# Patient Record
Sex: MALE | Race: BLACK OR AFRICAN AMERICAN | NOT HISPANIC OR LATINO | Employment: FULL TIME | URBAN - METROPOLITAN AREA
[De-identification: names, ages, dates, MRNs, and addresses within clinical notes are randomized per-mention and may not be internally consistent; named-entity substitution may affect disease eponyms.]

---

## 2022-08-03 ENCOUNTER — HOSPITAL ENCOUNTER (EMERGENCY)
Facility: HOSPITAL | Age: 32
Discharge: HOME | End: 2022-08-03
Attending: SPECIALIST
Payer: MEDICAID

## 2022-08-03 VITALS
SYSTOLIC BLOOD PRESSURE: 136 MMHG | RESPIRATION RATE: 16 BRPM | OXYGEN SATURATION: 100 % | HEART RATE: 86 BPM | TEMPERATURE: 98 F | DIASTOLIC BLOOD PRESSURE: 82 MMHG

## 2022-08-03 DIAGNOSIS — Z76.0 MEDICATION REFILL: Primary | ICD-10-CM

## 2022-08-03 PROCEDURE — 99283 EMERGENCY DEPT VISIT LOW MDM: CPT

## 2022-08-03 RX ORDER — ACYCLOVIR 800 MG/1
800 TABLET ORAL
Qty: 50 TABLET | Refills: 0 | Status: SHIPPED | OUTPATIENT
Start: 2022-08-03 | End: 2022-12-19 | Stop reason: SDUPTHER

## 2022-08-03 NOTE — ED PROVIDER NOTES
Encounter Date: 8/3/2022       History     Chief Complaint   Patient presents with    Medication Refill     Medication refill     Patient reports a history of genital herpes and is out of his medication acyclovir        Review of patient's allergies indicates:  No Known Allergies  No past medical history on file.  No past surgical history on file.  No family history on file.     Review of Systems   Constitutional: Negative.    Respiratory: Negative.    Cardiovascular: Negative.    Gastrointestinal: Negative.    Genitourinary: Positive for genital sores (Usually once a year).   Musculoskeletal: Negative.    Neurological: Negative.        Physical Exam     Initial Vitals [08/03/22 0431]   BP Pulse Resp Temp SpO2   136/82 86 16 98.2 °F (36.8 °C) 100 %      MAP       --         Physical Exam    Nursing note and vitals reviewed.  Constitutional: He appears well-developed and well-nourished.   HENT:   Head: Normocephalic and atraumatic.   Eyes: EOM are normal. Pupils are equal, round, and reactive to light.   Neck: Neck supple.   Normal range of motion.  Cardiovascular: Normal rate, regular rhythm and normal heart sounds.   Pulmonary/Chest: Breath sounds normal.   Abdominal: Abdomen is soft.   Musculoskeletal:         General: Normal range of motion.      Cervical back: Normal range of motion and neck supple.     Neurological: He is alert and oriented to person, place, and time.   Skin: Skin is warm and dry.         ED Course   Procedures  Labs Reviewed - No data to display       Imaging Results    None          Medications - No data to display                       Clinical Impression:   Final diagnoses:  [Z76.0] Medication refill (Primary)          ED Disposition Condition    Discharge Stable        ED Prescriptions     Medication Sig Dispense Start Date End Date Auth. Provider    acyclovir (ZOVIRAX) 800 MG Tab Take 1 tablet (800 mg total) by mouth 5 (five) times daily. for 10 days 50 tablet 8/3/2022 8/13/2022 Sean  HUMAIRA Galvez MD        Follow-up Information     Follow up With Specialties Details Why Contact Info    [facility] - Emergency Dept Emergency Medicine  As needed [address] [facility] [id number] [phone]           Sean Galvez MD  08/03/22 0459

## 2022-12-19 ENCOUNTER — HOSPITAL ENCOUNTER (EMERGENCY)
Facility: HOSPITAL | Age: 32
Discharge: HOME | End: 2022-12-19
Attending: EMERGENCY MEDICINE
Payer: MEDICAID

## 2022-12-19 VITALS
BODY MASS INDEX: 26.22 KG/M2 | DIASTOLIC BLOOD PRESSURE: 74 MMHG | RESPIRATION RATE: 20 BRPM | SYSTOLIC BLOOD PRESSURE: 132 MMHG | HEART RATE: 84 BPM | TEMPERATURE: 98 F | WEIGHT: 177 LBS | OXYGEN SATURATION: 100 % | HEIGHT: 69 IN

## 2022-12-19 DIAGNOSIS — A60.01 HERPES SIMPLEX INFECTION OF PENIS: Primary | ICD-10-CM

## 2022-12-19 PROCEDURE — 99283 EMERGENCY DEPT VISIT LOW MDM: CPT

## 2022-12-19 RX ORDER — ACYCLOVIR 800 MG/1
800 TABLET ORAL
Qty: 50 TABLET | Refills: 0 | Status: SHIPPED | OUTPATIENT
Start: 2022-12-19 | End: 2023-03-29 | Stop reason: SDUPTHER

## 2022-12-19 NOTE — ED PROVIDER NOTES
Encounter Date: 12/19/2022       History     Chief Complaint   Patient presents with    Medication Refill     Patient reports broke out in genital herpes needs prescriptions     This 32-year-old man with history of genital herpes reports he began with a new outbreak since last night.  Presents requesting prescription.     Review of patient's allergies indicates:  No Known Allergies  History reviewed. No pertinent past medical history.  History reviewed. No pertinent surgical history.  History reviewed. No pertinent family history.     Review of Systems   Constitutional:  Negative for fever.   HENT:  Negative for sore throat.    Respiratory:  Negative for shortness of breath.    Cardiovascular:  Negative for chest pain.   Gastrointestinal:  Negative for nausea.   Genitourinary:  Negative for dysuria.   Musculoskeletal:  Negative for back pain.   Skin:  Negative for rash.   Neurological:  Negative for weakness.   Hematological:  Does not bruise/bleed easily.     Physical Exam     Initial Vitals [12/19/22 0706]   BP Pulse Resp Temp SpO2   132/74 84 20 98.2 °F (36.8 °C) 100 %      MAP       --         Physical Exam    Constitutional: He appears well-developed and well-nourished.   HENT:   Head: Normocephalic and atraumatic.   Mouth/Throat: Mucous membranes are normal.   Eyes: EOM are normal. Pupils are equal, round, and reactive to light.   Neck: Neck supple.   Normal range of motion.  Cardiovascular:  Normal rate, regular rhythm, normal heart sounds and intact distal pulses.           Pulmonary/Chest: Breath sounds normal.   Abdominal: Abdomen is soft. Bowel sounds are normal.   Genitourinary:    Genitourinary Comments: 4-5 small vesicles on the penile shaft     Musculoskeletal:         General: Normal range of motion.      Cervical back: Normal range of motion and neck supple.     Neurological: He is alert and oriented to person, place, and time. He has normal strength.   Skin: Skin is warm and dry. Capillary refill  takes less than 2 seconds.   Psychiatric: He has a normal mood and affect. His behavior is normal. Judgment and thought content normal.       ED Course   Procedures  Labs Reviewed - No data to display       Imaging Results    None          Medications - No data to display                           Clinical Impression:   Final diagnoses:  [A60.01] Herpes simplex infection of penis (Primary)        ED Disposition Condition    Discharge Stable          ED Prescriptions       Medication Sig Dispense Start Date End Date Auth. Provider    acyclovir (ZOVIRAX) 800 MG Tab Take 1 tablet (800 mg total) by mouth 5 (five) times daily. for 10 days 50 tablet 12/19/2022 12/29/2022 Harshal Goldberg MD          Follow-up Information    None          Harshal Goldberg MD  12/19/22 4364

## 2022-12-19 NOTE — Clinical Note
"Brock Up"Yaya was seen and treated in our emergency department on 12/19/2022.  He may return to work on 12/19/2022.  May return to work today     If you have any questions or concerns, please don't hesitate to call.      Dr. Goldberg/PGuillory RN    "

## 2023-01-02 ENCOUNTER — HOSPITAL ENCOUNTER (EMERGENCY)
Facility: HOSPITAL | Age: 33
Discharge: HOME | End: 2023-01-02
Attending: STUDENT IN AN ORGANIZED HEALTH CARE EDUCATION/TRAINING PROGRAM
Payer: MEDICAID

## 2023-01-02 VITALS
TEMPERATURE: 98 F | OXYGEN SATURATION: 99 % | BODY MASS INDEX: 24.44 KG/M2 | HEIGHT: 69 IN | DIASTOLIC BLOOD PRESSURE: 74 MMHG | SYSTOLIC BLOOD PRESSURE: 122 MMHG | WEIGHT: 165 LBS | RESPIRATION RATE: 18 BRPM | HEART RATE: 91 BPM

## 2023-01-02 DIAGNOSIS — R59.1 LYMPHADENOPATHY: ICD-10-CM

## 2023-01-02 DIAGNOSIS — R22.1 NECK MASS: Primary | ICD-10-CM

## 2023-01-02 DIAGNOSIS — R22.1 NECK SWELLING: Primary | ICD-10-CM

## 2023-01-02 LAB
ANION GAP SERPL CALC-SCNC: 11 MEQ/L
BASOPHILS # BLD AUTO: 0.02 X10(3)/MCL (ref 0–0.2)
BASOPHILS NFR BLD AUTO: 0.3 %
BUN SERPL-MCNC: 9.9 MG/DL (ref 8.9–20.6)
CALCIUM SERPL-MCNC: 9.3 MG/DL (ref 8.4–10.2)
CHLORIDE SERPL-SCNC: 101 MMOL/L (ref 98–107)
CO2 SERPL-SCNC: 30 MMOL/L (ref 22–29)
CREAT SERPL-MCNC: 0.86 MG/DL (ref 0.73–1.18)
CREAT/UREA NIT SERPL: 12
EOSINOPHIL # BLD AUTO: 0.22 X10(3)/MCL (ref 0–0.9)
EOSINOPHIL NFR BLD AUTO: 3.8 %
ERYTHROCYTE [DISTWIDTH] IN BLOOD BY AUTOMATED COUNT: 12.4 % (ref 11.6–14.4)
GFR SERPLBLD CREATININE-BSD FMLA CKD-EPI: >90 MLS/MIN/1.73/M2
GLUCOSE SERPL-MCNC: 81 MG/DL (ref 74–100)
HCT VFR BLD AUTO: 46.5 % (ref 42–52)
HGB BLD-MCNC: 14.3 GM/DL (ref 14–18)
IMM GRANULOCYTES # BLD AUTO: 0 X10(3)/MCL (ref 0–0.04)
IMM GRANULOCYTES NFR BLD AUTO: 0 %
LYMPHOCYTES # BLD AUTO: 1.71 X10(3)/MCL (ref 0.6–4.6)
LYMPHOCYTES NFR BLD AUTO: 29.2 %
MCH RBC QN AUTO: 28.6 PG
MCHC RBC AUTO-ENTMCNC: 30.8 MG/DL (ref 33–36)
MCV RBC AUTO: 93 FL (ref 80–94)
MONOCYTES # BLD AUTO: 0.64 X10(3)/MCL (ref 0.1–1.3)
MONOCYTES NFR BLD AUTO: 10.9 %
NEUTROPHILS # BLD AUTO: 3.27 X10(3)/MCL (ref 2.1–9.2)
NEUTROPHILS NFR BLD AUTO: 55.8 %
PLATELET # BLD AUTO: 252 X10(3)/MCL (ref 140–371)
PMV BLD AUTO: 9 FL (ref 9.4–12.4)
POTASSIUM SERPL-SCNC: 3.5 MMOL/L (ref 3.5–5.1)
RBC # BLD AUTO: 5 X10(6)/MCL (ref 4.7–6.1)
SODIUM SERPL-SCNC: 142 MMOL/L (ref 136–145)
WBC # SPEC AUTO: 5.9 X10(3)/MCL (ref 4.5–11.5)

## 2023-01-02 PROCEDURE — 99285 EMERGENCY DEPT VISIT HI MDM: CPT | Mod: 25

## 2023-01-02 PROCEDURE — 25500020 PHARM REV CODE 255: Performed by: NURSE PRACTITIONER

## 2023-01-02 PROCEDURE — 85025 COMPLETE CBC W/AUTO DIFF WBC: CPT | Performed by: NURSE PRACTITIONER

## 2023-01-02 PROCEDURE — 25000003 PHARM REV CODE 250: Performed by: NURSE PRACTITIONER

## 2023-01-02 PROCEDURE — 63600175 PHARM REV CODE 636 W HCPCS: Performed by: NURSE PRACTITIONER

## 2023-01-02 PROCEDURE — 80048 BASIC METABOLIC PNL TOTAL CA: CPT | Performed by: NURSE PRACTITIONER

## 2023-01-02 RX ORDER — AMOXICILLIN AND CLAVULANATE POTASSIUM 875; 125 MG/1; MG/1
1 TABLET, FILM COATED ORAL 2 TIMES DAILY
Qty: 14 TABLET | Refills: 0 | Status: SHIPPED | OUTPATIENT
Start: 2023-01-02 | End: 2023-03-29 | Stop reason: SDUPTHER

## 2023-01-02 RX ORDER — PREDNISONE 20 MG/1
20 TABLET ORAL
Status: COMPLETED | OUTPATIENT
Start: 2023-01-02 | End: 2023-01-02

## 2023-01-02 RX ORDER — AMOXICILLIN AND CLAVULANATE POTASSIUM 875; 125 MG/1; MG/1
1 TABLET, FILM COATED ORAL
Status: COMPLETED | OUTPATIENT
Start: 2023-01-02 | End: 2023-01-02

## 2023-01-02 RX ORDER — METHYLPREDNISOLONE 4 MG/1
TABLET ORAL
Qty: 1 EACH | Refills: 0 | Status: SHIPPED | OUTPATIENT
Start: 2023-01-02

## 2023-01-02 RX ADMIN — AMOXICILLIN AND CLAVULANATE POTASSIUM 1 TABLET: 875; 125 TABLET, FILM COATED ORAL at 07:01

## 2023-01-02 RX ADMIN — IOPAMIDOL 100 ML: 755 INJECTION, SOLUTION INTRAVENOUS at 05:01

## 2023-01-02 RX ADMIN — PREDNISONE 20 MG: 20 TABLET ORAL at 07:01

## 2023-01-02 NOTE — ED PROVIDER NOTES
"Encounter Date: 1/2/2023       History     Chief Complaint   Patient presents with    Neck Swelling     Complains of right lateral neck swelling for a couple of months but now the swelling under his jaw and is "irritated" to swallow. Also now reports decreased hearing in the right ear     32 year old male presents to ER with swelling of right side of neck for several months. He states now having irritation with swallowing and decreased hearing of right ear. He denies shortness of breath, fever or vomiting. No drainage from ear. Patient tolerating secretions without difficulty.     The history is provided by the patient. No  was used.   Review of patient's allergies indicates:  No Known Allergies  History reviewed. No pertinent past medical history.  History reviewed. No pertinent surgical history.  No family history on file.  Social History     Tobacco Use    Smoking status: Some Days     Types: Cigarettes    Smokeless tobacco: Never     Review of Systems   Constitutional:  Negative for chills and fever.   HENT:  Positive for hearing loss and sore throat.    Respiratory:  Negative for cough and shortness of breath.    Musculoskeletal:  Positive for neck pain. Negative for neck stiffness.   Skin:  Negative for color change, rash and wound.   All other systems reviewed and are negative.    Physical Exam     Initial Vitals [01/02/23 1424]   BP Pulse Resp Temp SpO2   122/74 91 18 97.9 °F (36.6 °C) 99 %      MAP       --         Physical Exam    Constitutional: He appears well-developed and well-nourished.   HENT:   Mouth/Throat: Oropharynx is clear and moist.   Cerumen impaction bilaterally   Eyes: EOM are normal.   Neck:       Cardiovascular:  Normal rate and regular rhythm.           Pulmonary/Chest: Breath sounds normal. No respiratory distress.   Abdominal: Abdomen is soft. There is no abdominal tenderness.   Musculoskeletal:         General: Normal range of motion.     Neurological: He is alert " and oriented to person, place, and time.   Skin: Skin is warm and dry. Capillary refill takes less than 2 seconds.   Psychiatric: He has a normal mood and affect.       ED Course   Procedures  Labs Reviewed   BASIC METABOLIC PANEL - Abnormal; Notable for the following components:       Result Value    Carbon Dioxide 30 (*)     All other components within normal limits   CBC WITH DIFFERENTIAL - Abnormal; Notable for the following components:    MCHC 30.8 (*)     MPV 9.0 (*)     All other components within normal limits   CBC W/ AUTO DIFFERENTIAL    Narrative:     The following orders were created for panel order CBC auto differential.  Procedure                               Abnormality         Status                     ---------                               -----------         ------                     CBC with Differential[[id number]]        Abnormal            Final result                 Please view results for these tests on the individual orders.          Imaging Results              CT Soft Tissue Neck With Contrast (Preliminary result)  Result time 01/02/23 18:52:02      Preliminary result by Eduard Sanders MD (01/02/23 18:52:02)                   Narrative:    START OF REPORT:  Technique: CT of the soft tissues of the neck was performed with intravenous contrast with direct axial as well as sagittal and coronal reformations.    Comparison: None.    Clinical history: Neck Swelling (Complains of right lateral neck swelling for a couple of months but now the swelling under his jaw and is irritated to swallow. Also now reports decreased hearing in the right ear).    Findings: There is mild asymmetric enlargement of the right submandibular gland.There are also a few prominent lymph nodes in the bilateral cervical lymph node chains.  Sinuses: The visualized paranasal sinuses are clear.  Nasopharynx: Unremarkable.  Oropharynx: Unremarkable.  Larynx: Unremarkable.  Trachea: Unremarkable.  Esophagus:  Unremarkable.  Vascular structures: Unremarkable.  Lymph nodes: There are a few prominent cervical lymph nodes which are likely reactive.    Bony structures:  Alignment: No listhesis identified.  Mineralization of the Cervical Spine Bony Structures: Normal.  Curvature: Normal cervical lordosis.  Fractures: None.    Degenerative changes: No significant degenerative changes are seen.    Miscellaneous: There are soft tissue densities within the bilateral external auditory canals.      Impression:  1. There is mild asymmetric enlargement of the right submandibular gland.There are also a few prominent lymph nodes in the bilateral cervical lymph node chains. This may reflect an infectious / inflammatory process. Correlate clinically as regards further evaluation and follow up.  2. There are soft tissue densities within the bilateral external auditory canals. This may represent bilateral impacted cerumen. Correlate with visual inspection.                          Preliminary result by Interface, Rad Results In (01/02/23 18:52:02)                   Narrative:    START OF REPORT:  Technique: CT of the soft tissues of the neck was performed with intravenous contrast with direct axial as well as sagittal and coronal reformations.    Comparison: None.    Clinical history: Neck Swelling (Complains of right lateral neck swelling for a couple of months but now the swelling under his jaw and is irritated to swallow. Also now reports decreased hearing in the right ear).    Findings: There is mild asymmetric enlargement of the right submandibular gland.There are also a few prominent lymph nodes in the bilateral cervical lymph node chains.  Sinuses: The visualized paranasal sinuses are clear.  Nasopharynx: Unremarkable.  Oropharynx: Unremarkable.  Larynx: Unremarkable.  Trachea: Unremarkable.  Esophagus: Unremarkable.  Vascular structures: Unremarkable.  Lymph nodes: There are a few prominent cervical lymph nodes which are likely  reactive.    Bony structures:  Alignment: No listhesis identified.  Mineralization of the Cervical Spine Bony Structures: Normal.  Curvature: Normal cervical lordosis.  Fractures: None.    Degenerative changes: No significant degenerative changes are seen.    Miscellaneous: There are soft tissue densities within the bilateral external auditory canals.      Impression:  1. There is mild asymmetric enlargement of the right submandibular gland.There are also a few prominent lymph nodes in the bilateral cervical lymph node chains. This may reflect an infectious / inflammatory process. Correlate clinically as regards further evaluation and follow up.  2. There are soft tissue densities within the bilateral external auditory canals. This may represent bilateral impacted cerumen. Correlate with visual inspection.                                         Medications   iopamidoL (ISOVUE-370) injection 100 mL (100 mLs Intravenous Given 1/2/23 1751)   amoxicillin-clavulanate 875-125mg per tablet 1 tablet (1 tablet Oral Given 1/2/23 1900)   predniSONE tablet 20 mg (20 mg Oral Given 1/2/23 1900)     Medical Decision Making:   Differential Diagnosis:   Lymphadenopathy, neck mass, sialadenitis, cerumen impaction, OM, OE.   Clinical Tests:   Lab Tests: Reviewed and Ordered  The following lab test(s) were unremarkable: CBC and BMP       <> Summary of Lab: No leukocytosis, normal renal function  Radiological Study: Ordered and Reviewed  ED Management:  CT scan of neck show swelling over right submandibular gland with cervical lymphadenopathy. Case discussed with Dr. Momin who agrees with discharging patient with Augmentin, Medrol dosepack and referral to [facility] ENT clinic. Patient swallowing without difficulty and has no airway compromise.            ED Course as of 01/02/23 1902 Mon Jan 02, 2023 1843 WBC: 5.9  No leukocytosis [LN]   1843 Creatinine: 0.86  Normal renal function [LN]      ED Course User Index  [LN] Sarah GUTIERREZ  NERIS Dalton                 Clinical Impression:   Final diagnoses:  [R22.1] Neck swelling (Primary)  [R59.1] Lymphadenopathy        ED Disposition Condition    Discharge Stable          ED Prescriptions       Medication Sig Dispense Start Date End Date Auth. Provider    amoxicillin-clavulanate 875-125mg (AUGMENTIN) 875-125 mg per tablet Take 1 tablet by mouth 2 (two) times daily. 14 tablet 1/2/2023 -- NERIS Humphrey    methylPREDNISolone (MEDROL DOSEPACK) 4 mg tablet As directed on package 1 each 1/2/2023 -- NERIS Humphrey          Follow-up Information    None          NERIS Humphrey  01/02/23 1279

## 2023-01-03 ENCOUNTER — HOSPITAL ENCOUNTER (EMERGENCY)
Facility: HOSPITAL | Age: 33
Discharge: HOME | End: 2023-01-03
Attending: EMERGENCY MEDICINE
Payer: MEDICAID

## 2023-01-03 VITALS
HEIGHT: 69 IN | RESPIRATION RATE: 20 BRPM | DIASTOLIC BLOOD PRESSURE: 74 MMHG | HEART RATE: 79 BPM | TEMPERATURE: 98 F | SYSTOLIC BLOOD PRESSURE: 117 MMHG | BODY MASS INDEX: 24.88 KG/M2 | OXYGEN SATURATION: 99 % | WEIGHT: 168 LBS

## 2023-01-03 DIAGNOSIS — H61.23 BILATERAL IMPACTED CERUMEN: ICD-10-CM

## 2023-01-03 DIAGNOSIS — M54.2 NECK PAIN: Primary | ICD-10-CM

## 2023-01-03 PROCEDURE — 96372 THER/PROPH/DIAG INJ SC/IM: CPT | Performed by: PHYSICIAN ASSISTANT

## 2023-01-03 PROCEDURE — 99284 EMERGENCY DEPT VISIT MOD MDM: CPT

## 2023-01-03 PROCEDURE — 63600175 PHARM REV CODE 636 W HCPCS: Performed by: PHYSICIAN ASSISTANT

## 2023-01-03 RX ORDER — KETOROLAC TROMETHAMINE 10 MG/1
10 TABLET, FILM COATED ORAL EVERY 6 HOURS PRN
Qty: 20 TABLET | Refills: 0 | Status: SHIPPED | OUTPATIENT
Start: 2023-01-03 | End: 2023-01-08

## 2023-01-03 RX ORDER — KETOROLAC TROMETHAMINE 30 MG/ML
60 INJECTION, SOLUTION INTRAMUSCULAR; INTRAVENOUS
Status: COMPLETED | OUTPATIENT
Start: 2023-01-03 | End: 2023-01-03

## 2023-01-03 RX ADMIN — KETOROLAC TROMETHAMINE 60 MG: 30 INJECTION, SOLUTION INTRAMUSCULAR at 12:01

## 2023-01-03 NOTE — ED PROVIDER NOTES
Encounter Date: 1/3/2023       History     Chief Complaint   Patient presents with    Neck Pain     Pt reports pov c/o swelling/ discomfort of R neck x2 months. States pain goes into R ear. Seen at [facility] yesterday, CT done showing enlarged lymph nodes. Started on abx.      32 y.o.  male presents to Emergency Department with a chief complaint of L neck pain. Symptoms began months ago and are constant since onset. Associated symptoms include decreased hearing and mass to R neck. Symptoms are aggravated with swallowing and there are no alleviating factors. The patient denies CP, SOB, wheezing, trouble swallowing, voice change, or fever. Patient seen at [facility] on yesterday and diagnosed with enlargement of submandibular gland and reactive cervical lymph nodes with medications and referral to ENT.  No other reported symptoms at this time      The history is provided by the patient. No  was used.   Neck Pain   This is a new problem. The current episode started several weeks ago. The problem occurs throughout the day. The problem has been unchanged. The pain is associated with nothing. The pain is present in the right side. The symptoms are aggravated by swallowing. The pain is The same all the time. Stiffness is present All day. Pertinent negatives include no photophobia, no chest pain, no syncope, no headaches, no bowel incontinence, no bladder incontinence, no paresis and no weakness.   Review of patient's allergies indicates:  No Known Allergies  History reviewed. No pertinent past medical history.  History reviewed. No pertinent surgical history.  No family history on file.  Social History     Tobacco Use    Smoking status: Some Days     Types: Cigarettes    Smokeless tobacco: Never     Review of Systems   Constitutional:  Negative for chills, fatigue and fever.   HENT:  Positive for hearing loss. Negative for sore throat, trouble swallowing and voice change.    Eyes:  Negative for  photophobia and visual disturbance.   Respiratory:  Negative for shortness of breath, wheezing and stridor.    Cardiovascular:  Negative for chest pain, leg swelling and syncope.   Gastrointestinal:  Negative for abdominal pain, bowel incontinence, nausea and vomiting.   Genitourinary:  Negative for bladder incontinence.   Musculoskeletal:  Positive for neck pain. Negative for back pain and gait problem.   Neurological:  Negative for dizziness, seizures, weakness and headaches.   All other systems reviewed and are negative.    Physical Exam     Initial Vitals [01/03/23 1119]   BP Pulse Resp Temp SpO2   117/74 79 20 98.4 °F (36.9 °C) 99 %      MAP       --         Physical Exam    Nursing note and vitals reviewed.  Constitutional: Vital signs are normal. He appears well-developed and well-nourished. He is not diaphoretic. He is cooperative.  Non-toxic appearance. No distress.   HENT:   Head: Normocephalic and atraumatic.   Right Ear: External ear normal. Decreased hearing is noted.   Left Ear: External ear normal. Decreased hearing is noted.   Nose: Nose normal.   Mouth/Throat: Uvula is midline, oropharynx is clear and moist and mucous membranes are normal. No oropharyngeal exudate, posterior oropharyngeal edema or posterior oropharyngeal erythema.   Bilateral cerumen obscured with cerumen.    Eyes: Conjunctivae and EOM are normal. Pupils are equal, round, and reactive to light. Right eye exhibits no discharge. Left eye exhibits no discharge.   Neck: Neck supple. No JVD present.       5 x 8 cm firm area of swelling noted to R side of neck (outlined). Full 5/5 ROM noted. No fluctuance, drainage, or weeping noted.    Normal range of motion.  Cardiovascular:  Normal rate, regular rhythm, normal heart sounds and intact distal pulses.           Pulmonary/Chest: Breath sounds normal. No stridor. No respiratory distress. He has no wheezes. He exhibits no tenderness.   Abdominal: Abdomen is soft. Bowel sounds are normal. He  exhibits no distension. There is no abdominal tenderness. There is no guarding.   Musculoskeletal:         General: Edema present. Normal range of motion.      Cervical back: Normal range of motion and neck supple. Edema present. No spinous process tenderness. Normal range of motion.     Neurological: He is alert and oriented to person, place, and time. He has normal strength. No sensory deficit. GCS score is 15. GCS eye subscore is 4. GCS verbal subscore is 5. GCS motor subscore is 6.   Skin: Skin is warm and dry. Capillary refill takes less than 2 seconds. No rash noted. No erythema.   Psychiatric: He has a normal mood and affect. Thought content normal.       ED Course   Procedures  Labs Reviewed - No data to display       Imaging Results    None          Medications   ketorolac injection 60 mg (60 mg Intramuscular Given 1/3/23 1238)     Medical Decision Making:   History:   Old Records Summarized: records from another hospital.       <> Summary of Records: Reviewed clinical tests and notes from yesterday at [facility]. CT revealed enlarged lymph nodes and enlargement of submandibular gland. NP spoke with ENT services, prescribed steroids and abx, and referral to [facility] sent.   Differential Diagnosis:   Cerumen Impaction, Neck Mass, Neck Pain  ED Management:  The patient's CT on yesterday revealed enlargement of submandibular gland and cervical lymph node enlargement which adds to the complexity or risk for the patient. Differential diagnoses include cerumen impaction, neck pain, and neck mass. I reviewed information regarding patient's visit on yesterday at [facility]. Patient complaining of same complaints, denies worsening symptoms. Instructed patient to continue taking previously prescribed medications and follow up with ENT (referral sent). Prescribed Debrox for cerumen impaction and Toradol for neck pain. Strict ER return precautions given. Patient able to swallow with difficulty and no respiratory distress noted.  Patient denies new or additional complaints; no further tests indicated at this time. Verbalized understanding of instructions. No emergent or apparent distress noted prior to discharge. To follow up with PCP in 1 week as needed. Patient discharged home.                         Clinical Impression:   Final diagnoses:  [M54.2] Neck pain (Primary)  [H61.23] Bilateral impacted cerumen        ED Disposition Condition    Discharge Stable          ED Prescriptions       Medication Sig Dispense Start Date End Date Auth. Provider    ketorolac (TORADOL) 10 mg tablet Take 1 tablet (10 mg total) by mouth every 6 (six) hours as needed for Pain. 20 tablet 1/3/2023 1/8/2023 Sussy Coelho NP    carbamide peroxide (DEBROX) 6.5 % otic solution Place 5 drops into both ears as needed (Place 5 drops into both ears as needed.). 18 mL 1/3/2023 -- Sussy Coelho NP          Follow-up Information       Follow up With Specialties Details Why Contact Info Additional Information    [facility] - Emergency Dept Emergency Medicine Go to  If symptoms worsen, As needed [address] [address] [phone]     PCP  Call in 1 week As needed, If symptoms worsen      [facility], Entrance 6 Otolaryngology Call   [address] [facility] [address] [phone] [facility] - ENT,  Entrance #6 Please sign with the  when you arrive.             Sussy Coelho NP  01/03/23 8884

## 2023-01-03 NOTE — DISCHARGE INSTRUCTIONS
Augmentin twice a day  Medrol dosepack as directed, start on Tuesday  A referral has been sent to [facility] ENT clinic, you should be contacted with appt.

## 2023-01-03 NOTE — DISCHARGE INSTRUCTIONS
Thanks for letting us take care of you today!  It is our goal to give you courteous care and to keep you comfortable and informed, if you have any questions before you leave I will be happy to try and answer them.    Here is some advice after your visit:      Your visit in the emergency department is NOT definitive care - please follow-up with your primary care doctor and/or specialist within 1 week.  Please return if you have any worsening in your condition or if you have any other concerns.    Please take the full course of  any ANTIBIOTICS you were prescribed - incomplete courses of antibiotics can cause resistance to antibiotics in the future which will make it difficult to treat any infections you may have.

## 2023-01-03 NOTE — FIRST PROVIDER EVALUATION
"Medical screening examination initiated.  I have conducted a focused provider triage encounter, findings are as follows:    Brief history of present illness:  32 year old male presents to ER with swelling of right side of neck for several months. He states now having irritation with swallowing and decreased hearing of right ear. Seen last night with blood work and CT scan.  Enlarged lymph node noted placed on antibiotics and steroids.  Referral sent to ENT.  Patient here due to pain    Vitals:    01/03/23 1119   BP: 117/74   Pulse: 79   Resp: 20   Temp: 98.4 °F (36.9 °C)   TempSrc: Oral   SpO2: 99%   Weight: 76.2 kg (168 lb)   Height: 5' 9" (1.753 m)       Pertinent physical exam:  Patient is awake and alert and oriented.  Ambulatory to triage.  In no acute distress.      Brief workup plan:  Toradol     Preliminary workup initiated; this workup will be continued and followed by the physician or advanced practice provider that is assigned to the patient when roomed.  "

## 2023-03-29 ENCOUNTER — OFFICE VISIT (OUTPATIENT)
Dept: OTOLARYNGOLOGY | Facility: CLINIC | Age: 33
End: 2023-03-29
Payer: MEDICAID

## 2023-03-29 VITALS
HEART RATE: 77 BPM | TEMPERATURE: 97 F | DIASTOLIC BLOOD PRESSURE: 79 MMHG | WEIGHT: 157.38 LBS | SYSTOLIC BLOOD PRESSURE: 127 MMHG | BODY MASS INDEX: 23.24 KG/M2

## 2023-03-29 DIAGNOSIS — R22.1 NECK MASS: ICD-10-CM

## 2023-03-29 DIAGNOSIS — H93.8X1 EAR CANAL MASS, RIGHT: ICD-10-CM

## 2023-03-29 DIAGNOSIS — H93.13 TINNITUS, BILATERAL: ICD-10-CM

## 2023-03-29 DIAGNOSIS — K11.5 SIALOLITHIASIS: Primary | ICD-10-CM

## 2023-03-29 PROCEDURE — 31575 DIAGNOSTIC LARYNGOSCOPY: CPT | Mod: PBBFAC | Performed by: STUDENT IN AN ORGANIZED HEALTH CARE EDUCATION/TRAINING PROGRAM

## 2023-03-29 PROCEDURE — 99213 OFFICE O/P EST LOW 20 MIN: CPT | Mod: PBBFAC,25 | Performed by: STUDENT IN AN ORGANIZED HEALTH CARE EDUCATION/TRAINING PROGRAM

## 2023-03-29 PROCEDURE — 69210 REMOVE IMPACTED EAR WAX UNI: CPT | Mod: 50,PBBFAC | Performed by: STUDENT IN AN ORGANIZED HEALTH CARE EDUCATION/TRAINING PROGRAM

## 2023-03-29 RX ORDER — ACYCLOVIR 800 MG/1
800 TABLET ORAL
Qty: 50 TABLET | Refills: 0 | Status: SHIPPED | OUTPATIENT
Start: 2023-03-29 | End: 2023-04-08

## 2023-03-29 RX ORDER — CIPROFLOXACIN AND DEXAMETHASONE 3; 1 MG/ML; MG/ML
4 SUSPENSION/ DROPS AURICULAR (OTIC) 2 TIMES DAILY
Qty: 7.5 ML | Refills: 1 | Status: SHIPPED | OUTPATIENT
Start: 2023-03-29

## 2023-03-29 RX ORDER — AMOXICILLIN AND CLAVULANATE POTASSIUM 875; 125 MG/1; MG/1
1 TABLET, FILM COATED ORAL EVERY 12 HOURS
Qty: 56 TABLET | Refills: 0 | Status: SHIPPED | OUTPATIENT
Start: 2023-03-29 | End: 2023-04-26

## 2023-03-29 NOTE — NURSING
The scope used for the exam was:  Flexible scope ENF-P4  Serial Number:  1)    [id number]    []   2)    [id number]    []   3)    [id number]    []   4)    [id number]    []   5)    [id number]    [x]   6)    [id number]    []       The scope used for the exam was:  Rigid scope   Serial Number:  1)   [id number]    []   2)   [id number]    []   3)   7330    []   4)    3384   []   5)    0824   []   6)    5554   []     7)   7425    []   8)   2240    []   9)   1109    []

## 2023-03-29 NOTE — PROGRESS NOTES
[facility]  Otolaryngology-Head & Neck Surgery    Office Visit    Brock Javier  [id number]  1990    CC:  Right-sided neck mass, neck pain, right-sided tinnitus    HPI: Brock Javier is a 32 y.o. male that presents for evaluation of right-sided neck mass.  He states that this has been present for about 5-6 months.  It is fairly firm but not particularly painful to the touch.  It sits right below his right ear.  He has been on antibiotics in the past, but this has not made it subside.  He did have a recent bout in January of right-sided silent adenitis of his right submandibular gland.  CT scan at that time showed this right-sided neck mass with lymphadenopathy present throughout levels 2 3 and 5 as well as 2 small salivary stones present in his right submandibular duct.  He states that he has recurrent swelling in his right submandibular region, but this is separate in his neck mass.  Also endorses bilateral decreased hearing and ear fullness and tinnitus, worse on the right side but also present on the left.  It is nonpulsatile in nature.  The some he denies any fevers, chills, weight loss.  He is never had any prior surgery.  Does have a history of genital herpes virgin takes acyclovir.  No prior surgery.      ROS:   General: Negative except per HPI  Skin: Denies rash, ulcer, or lesion.  Eyes: Denies vision changes or diplopia.  Ears: Negative except per HPI  Nose: Negative except per HPI  Throat/mouth: Negative except per HPI  Cardiovascular: Negative except per HPI  Respiratory: Negative except per HPI  Neck: Negative except per HPI  Endocrine: Negative except per HPI  Neurologic: Negative except per HPI    Review of patient's allergies indicates:  No Known Allergies    History reviewed. No pertinent past medical history.    History reviewed. No pertinent surgical history.    Social History     Socioeconomic History    Marital status: Single   Tobacco Use    Smoking  status: Some Days     Types: Cigarettes    Smokeless tobacco: Never       History reviewed. No pertinent family history.    Outpatient Encounter Medications as of 3/29/2023   Medication Sig Dispense Refill    acyclovir (ZOVIRAX) 800 MG Tab Take 1 tablet (800 mg total) by mouth 5 (five) times daily. for 10 days 50 tablet 0    amoxicillin-clavulanate 875-125mg (AUGMENTIN) 875-125 mg per tablet Take 1 tablet by mouth every 12 (twelve) hours. 56 tablet 0    carbamide peroxide (DEBROX) 6.5 % otic solution Place 5 drops into both ears as needed (Place 5 drops into both ears as needed.). 18 mL 0    ciprofloxacin-dexAMETHasone 0.3-0.1% (CIPRODEX) 0.3-0.1 % DrpS Place 4 drops into both ears 2 (two) times daily. 7.5 mL 1    methylPREDNISolone (MEDROL DOSEPACK) 4 mg tablet As directed on package 1 each 0    [DISCONTINUED] acyclovir (ZOVIRAX) 800 MG Tab Take 1 tablet (800 mg total) by mouth 5 (five) times daily. for 10 days 50 tablet 0    [DISCONTINUED] amoxicillin-clavulanate 875-125mg (AUGMENTIN) 875-125 mg per tablet Take 1 tablet by mouth 2 (two) times daily. 14 tablet 0     No facility-administered encounter medications on file as of 3/29/2023.       PHYSICAL EXAM:  Vitals:    03/29/23 0939   BP: 127/79   Pulse: 77   Temp: 97.3 °F (36.3 °C)       General Appearance: well nourished, well-developed, alert, oriented, in no acute distress  Head/Face: NC, AT  Eyes: PEERLA, EOMI, normal conjunctiva  Ears: Hears well at normal conversation volume  AD: external normal, ear canal normal, TM intact, no middle ear fluid  AS: external normal, ear canal normal, TM intact, no middle ear fluid  Nose: septum midline, no inferior turbinate hypertrophy, no polyps  OC/OP: dentition moderate, no oral lesions, FOM and BOT soft  Nasopharynx, Hypopharynx, and Larynx: indirect visualization attempted, limited view due to patient intolerance  Neck: soft, non-tender, no palpable lymph nodes, thyroid- no nodules or goiter  Neuro: CN II - XII  intact  Psychiatric: oriented to time, place and person, no depression, anxiety or agitation    Procedure: Otomicroscopy and Cerumen Removal  Ananda Etienne MD Date: 03/29/2023    The patient was seated in the procedure chair in the reclined position. The microscope was used to visualize the right EAC. Otologic instruments were used to carefully remove cerumen to visualize the canal and TM. The microscope was then turned and used to visualize the left EAC. Otologic instruments were used to carefully remove cerumen and visualize the canal and TM.    AD:  On the right we note thick dry cerumen filling the right EAC.  This was removed with a curette and suctioned.  Medially, there was a superiorly based polyp versus mass in the upper portion of the ear canal abutting and possibly involving the tympanic membrane, difficult to tell us could not see behind  AS: .  On the left we note thick dry cerumen filling the EAC.  This was removed with curette and suctioned.  The TM appeared intact without obvious middle ear effusion.    Procedure: Flexible Fiberoptic Laryngoscopy  Ananda Etienne MD    The risks, benefits, and alternatives of the procedure were discussed and informed written consent was obtained. The nose was decongested and anesthetized with topical oxymetazoline and tetracaine spray. The flexible laryngoscope was passed through the nasal passage.     NC/nasopharynx: WNL, septum midline without significant turbinate hypertrophy or secretions, nasopharynx without lesions  Oropharyngeal walls: WNL, no masses or lesions  BOT/vallecula: no significant lingual tonsillar hypertrophy, no masses or lesions  Epiglottis: crisp, WNL, no lesions  Arytenoids: full mobility, no masses or lesions  Piriform sinuses: no pooling of secretions, no masses or lesions  False vocal folds: WNL  TVFs: crisp TVFs, normal and full movement, no masses or lesions  Immediate subglottis without signs of lesions/scarring  No sign of malignancy  No pooling  of secretions    The patient tolerated the procedure well, without complications        PERTINENT DATA:  Outside CT scan of the neck with contrast reviewed from 01/2023.  He does have significant right-sided level 2 3 and 5 lymphadenopathy.  Also noted on the scan was right-sided sialoadenitis with sialolithiasis in the right submandibular duct.    ASSESSMENT:  Brock Javier is a 32 y.o. male with right-sided neck mass that has been present for 5-6 months.  Appears to be a conglomerate of lymphadenopathy.  He denies any B symptoms.  No obvious masses or lesions within the aerodigestive tract visualized on examination today.  He does have evidence of recurrent sialadenitis of the right submandibular gland with stones present in his submandibular duct.  Hearing much improved following cerumen impaction removal.  But there was noted to be a right EAC polyp versus granulation verse mass medially abutting the posterior superior aspect of the tympanic membrane    PLAN:  --.  Will plan for a CT of the temporal bone  --IR biopsy of the right-sided neck mass  --Ciprodex as well as Augmentin for 4 weeks  -- return to clinic after the above for evaluation.      Ananda Etienne MD  [facility]  11:05 AM 03/29/2023

## 2023-03-31 ENCOUNTER — TELEPHONE (OUTPATIENT)
Dept: INTERVENTIONAL RADIOLOGY/VASCULAR | Facility: HOSPITAL | Age: 33
End: 2023-03-31

## 2023-04-05 ENCOUNTER — TELEPHONE (OUTPATIENT)
Dept: INTERVENTIONAL RADIOLOGY/VASCULAR | Facility: HOSPITAL | Age: 33
End: 2023-04-05

## 2023-04-12 ENCOUNTER — TELEPHONE (OUTPATIENT)
Dept: INTERVENTIONAL RADIOLOGY/VASCULAR | Facility: HOSPITAL | Age: 33
End: 2023-04-12

## 2023-04-12 NOTE — TELEPHONE ENCOUNTER
Pt did not show up for his Right neck mass biopsy in IR. Called to reschedule for 4/17 @ 10. Pt agreed

## 2023-04-17 ENCOUNTER — TELEPHONE (OUTPATIENT)
Dept: INTERVENTIONAL RADIOLOGY/VASCULAR | Facility: HOSPITAL | Age: 33
End: 2023-04-17

## 2023-04-17 NOTE — TELEPHONE ENCOUNTER
Pt was no show for his appointment today. Tried to call him and his phone is out of service. Will refer back to clinic until they can get in touch with him and he is ready to proceed with the biopsy.

## 2023-05-13 ENCOUNTER — HOSPITAL ENCOUNTER (EMERGENCY)
Facility: HOSPITAL | Age: 33
Discharge: HOME | End: 2023-05-13
Attending: FAMILY MEDICINE
Payer: MEDICAID

## 2023-05-13 VITALS
DIASTOLIC BLOOD PRESSURE: 69 MMHG | TEMPERATURE: 99 F | WEIGHT: 175 LBS | HEART RATE: 86 BPM | RESPIRATION RATE: 18 BRPM | HEIGHT: 69 IN | BODY MASS INDEX: 25.92 KG/M2 | SYSTOLIC BLOOD PRESSURE: 110 MMHG | OXYGEN SATURATION: 97 %

## 2023-05-13 DIAGNOSIS — R22.1 NECK MASS: Primary | ICD-10-CM

## 2023-05-13 DIAGNOSIS — M54.2 NECK PAIN: Primary | ICD-10-CM

## 2023-05-13 PROCEDURE — 99284 EMERGENCY DEPT VISIT MOD MDM: CPT | Mod: 25

## 2023-05-13 RX ORDER — DICLOFENAC SODIUM 50 MG/1
50 TABLET, DELAYED RELEASE ORAL 3 TIMES DAILY
Qty: 9 TABLET | Refills: 0 | Status: SHIPPED | OUTPATIENT
Start: 2023-05-13 | End: 2023-05-16

## 2023-05-13 RX ORDER — AMOXICILLIN AND CLAVULANATE POTASSIUM 875; 125 MG/1; MG/1
1 TABLET, FILM COATED ORAL 2 TIMES DAILY
Qty: 14 TABLET | Refills: 0 | Status: SHIPPED | OUTPATIENT
Start: 2023-05-13 | End: 2023-05-20

## 2023-05-13 NOTE — ED PROVIDER NOTES
Encounter Date: 5/13/2023       History     Chief Complaint   Patient presents with    Neck Pain     Neck pain with swelling to R side x 1 mth -pt was seen at [facility] and was given Augmentin per ENT      Neck pain   32-year-old male patient diagnosed with salivary gland stone diagnosed 2 months ago patient otherwise did see ENT but did not follow-up appears that they were considering doing surgical removal of the stone patient has no fever chills or night sweats no nausea no vomiting no diarrhea no chronic condition contributing to today's episode      Review of patient's allergies indicates:  No Known Allergies  No past medical history on file.  No past surgical history on file.  No family history on file.  Social History     Tobacco Use    Smoking status: Some Days     Types: Cigarettes    Smokeless tobacco: Never     Review of Systems   Constitutional:  Negative for fever.   HENT:  Negative for sore throat.    Respiratory:  Negative for shortness of breath.    Cardiovascular:  Negative for chest pain.   Gastrointestinal:  Negative for nausea.   Genitourinary:  Negative for dysuria.   Musculoskeletal:  Negative for back pain.   Skin:  Negative for rash.   Neurological:  Negative for weakness.   Hematological:  Does not bruise/bleed easily.     Physical Exam     Initial Vitals [05/13/23 1208]   BP Pulse Resp Temp SpO2   110/69 86 18 98.6 °F (37 °C) 97 %      MAP       --         Physical Exam    Nursing note and vitals reviewed.  Constitutional: He appears well-developed and well-nourished. He is not diaphoretic. No distress.   HENT:   Head: Normocephalic and atraumatic.   Right Ear: External ear normal.   Left Ear: External ear normal.   Nose: Nose normal.   Mouth/Throat: Oropharynx is clear and moist. No oropharyngeal exudate.   Eyes: Conjunctivae and EOM are normal. Pupils are equal, round, and reactive to light. Right eye exhibits no discharge. Left eye exhibits no discharge.   Neck: Neck supple. No thyromegaly  present. No tracheal deviation present. No JVD present.   Normal range of motion.  Cardiovascular:  Normal rate, regular rhythm, normal heart sounds and intact distal pulses.     Exam reveals no gallop and no friction rub.       No murmur heard.  Pulmonary/Chest: Breath sounds normal. No stridor. No respiratory distress. He has no wheezes. He has no rhonchi. He has no rales. He exhibits no tenderness.   Abdominal: Abdomen is soft. Bowel sounds are normal. He exhibits no distension. There is no abdominal tenderness. There is no rebound and no guarding.   Musculoskeletal:         General: No tenderness or edema. Normal range of motion.      Cervical back: Normal range of motion and neck supple.     Lymphadenopathy:     He has cervical adenopathy.   Neurological: He is alert and oriented to person, place, and time. He has normal strength and normal reflexes. No cranial nerve deficit or sensory deficit. GCS score is 15. GCS eye subscore is 4. GCS verbal subscore is 5. GCS motor subscore is 6.   Skin: Skin is warm and dry. No rash and no abscess noted. No erythema.   Psychiatric: He has a normal mood and affect. His behavior is normal. Judgment and thought content normal.       ED Course   Procedures  Labs Reviewed - No data to display       Imaging Results              CT Soft Tissue Neck WO Contrast (Final result)  Result time 05/13/23 12:47:25      Final result by Lesley Bullard MD (05/13/23 12:47:25)                   Impression:      Right greater than left cervical lymphadenopathy.  Large level 2 right mass versus day conglomerate which is increased in size from previous exam.  Limited evaluation for necrotic or suppurative change by CT      Electronically signed by: Lesley Bullard  Date:    05/13/2023  Time:    12:47               Narrative:    EXAMINATION:  CT SOFT TISSUE NECK WITHOUT CONTRAST    CLINICAL HISTORY:  Neck mass, nonpulsatile;    TECHNIQUE:  Low-dose helical acquired CT images through the  neck were obtained without intravenous administration of contrast.  Axial, sagittal and coronal reformations.    Automated exposure control, adjustment of mA/kV and/or iterative reconstruction was utilized to limit radiation dose.    DLP: 611 mGycm.    COMPARISON:  CT neck with contrast 01/02/2023    FINDINGS:  LIMITATIONS: Without intravenous contrast evaluation of soft tissues is limited.    PHARYNGEAL SOFT TISSUES: Grossly normal appearance of the nasopharynx, oropharynx and hypopharynx.    ORAL CAVITY: Impacted 3rd mandibular molars.    LARYNX: Normal supraglottic, glottic and subglottic larynx.    LYMPH NODES: There is right cervical lymphadenopathy.  Conglomerate lesion deep to the right sternocleidomastoid measures up to 5.5 x 4.5 cm in greatest axial dimension (3, 37); previously 4.5 x 3.4 cm.  Internal attenuation 40 Hounsfield units.  There are prominent left cervical chain lymph nodes which are not as large is those on the right.    SOFT TISSUES: There is soft tissue edema on the right.    SALIVARY GLANDS: Normal parotid, submandibular and sublingual glands.    THYROID: Normal.    VESSELS AND CAROTID SPACE: Limited evaluation on noncontrast CT.    BONES: No acute osseous abnormality.    LUNG APICES: Well aerated.                                       Medications - No data to display  Medical Decision Making:   Differential Diagnosis:   Neck swelling lymphadenopathy neck abscess neck soft tissue mass                        Clinical Impression:   Final diagnoses:  [M54.2] Neck pain (Primary)        ED Disposition Condition    Discharge Stable          ED Prescriptions       Medication Sig Dispense Start Date End Date Auth. Provider    diclofenac (VOLTAREN) 50 MG EC tablet Take 1 tablet (50 mg total) by mouth 3 (three) times daily. for 3 days 9 tablet 5/13/2023 5/16/2023 Clifton Ceja MD    amoxicillin-clavulanate 875-125mg (AUGMENTIN) 875-125 mg per tablet Take 1 tablet by mouth 2 (two) times daily. for  7 days 14 tablet 5/13/2023 5/20/2023 Clifton Ceja MD          Follow-up Information       Follow up With Specialties Details Why Contact Info    ent  Schedule an appointment as soon as possible for a visit in 1 day               Clifton Ceja MD  05/13/23 5710

## 2023-05-16 ENCOUNTER — PATIENT OUTREACH (OUTPATIENT)
Dept: EMERGENCY MEDICINE | Facility: HOSPITAL | Age: 33
End: 2023-05-16
Payer: MEDICAID

## 2023-05-16 NOTE — PROGRESS NOTES
Identity verified.  Pt states his neck is still swollen and he is still having pain.  He states he has filled and is taking the medication as prescribed.  He states the pain medication gives minimal relief.  Instructed to complete all antibiotics and not to drive while taking the pain medication.  He denies any questions about the medications prescribed or ED discharge instructions. Pt had an ENT appt 3/29/23 regarding the neck mass and a CT Temporal Bones and a biopsy of the mass were ordered.  Pt was a NO SHOW for 2 appts for the CT and 2 appts for the biopsy.  The CT Temporal Bones is approved until 5/22/23.  Asked pt if he wanted to reschedule the CT Temporal Bones and he states he had a CT last night in the ED.  Explained to pt that the CT Temporal Bones focused on a different area that the CT of the Neck he had last pm.  Explained to him the Dr. Ananda Salgado saw the CT Neck done in January 2023 and ordered the additional CT.  Pt states he does not want to reschedule the missed neck biopsy because he does not want to be cut on twice.  Pt has no PCP.  Educated on the benefits of having a PCP and offered to schedule an appt to establish care, pt accepted.  Educated on eating a healthy diet, drinking plenty of water, when/where to get medical care.  Pt states he had a dental cleaning this year.  Educated on the benefits of oral cleanings every six months.  Patient denies any SDOH barriers at this time. Stressed the importance of keeping appointments and reinforced the use of urgent care clinic for non emergent issues until PCP established.  Patient verbalized understanding to all instructions.   [id number] Spoke with Damari in Centralized Scheduling and obtained an appointment for CT Temporal Bones 5/18/2023 1030 at [facility] 5/18/23 1030 with arrival at 1000.  [id number] Identity verified. Patient notified of appointment for CT Temporal Bones 5/18/23 1030 with arrival at 1000 at [facility].  He verbalized understanding.  [id number] Spoke with July at [facility] and obtained an appointment 6/2/23 0830 with Carmelina Zapata NP to establish care.  1223 Sent text message with appointment information, 6/2/23 0830 with Carmelina Zapata NP. Provided clinic address and phone number.    Appointment Reminder 5/31/2023  Follow up 6/13/2023    Appointments   PCP Visit Upcoming :   Yes   Appointment Date/Time :   6/2/2023 0830  PCP Visit Upcoming Reason : Establish Care  PCP Visit Within Year :   No  PCP Visit Within Year Reason:      PCP Visit One Year Date :    Follow-Up Specialist Appt Scheduled :  No  Type of Specialist :     Follow-Up Lab Appt Scheduled :  No  Follow-Up Date :      Follow-Up Radiology Appt Scheduled :  Yes  Radiology Orders : CT Temporal Bones 5/18/2023 1030    [facility]

## 2023-05-16 NOTE — PATIENT INSTRUCTIONS
If you have any questions call Cassy [phone].         Why Should I Have My Own Doctor or Nurse Practitioner (PCP) to Take Care of Me  What is a PCP (Primary Care Provider)?    A primary care provider is a doctor or nurse practitioner who you can call for an appointment and will see you when you are sick.    You will also be seen at scheduled appointment times during the year to check on your diabetes, or high blood pressure, or heart disease.    Why see the same PCP (doctor/nurse practitioner)?    You can be seen faster when you are sick           You, the PCP (doctor/nurse practitioner) and the office staff get to know each other; you begin to trust them to care for you. You take part in your health choices.   All of you together are a team.    Your medicine is looked at every time you visit, to be sure you are taking the medicine, as the PCP (doctor/nurse practitioner) ordered.    Your PCP (doctor/nurse practitioner) and their staff help keep you healthy and out of the hospital.  They can catch sicknesses earlier by ordering tests once a year to stop or prevent the sickness from getting worse.      Your PCP (doctor/nurse practitioner) can send you to providers who specialize (heart/bone/lung) if you need.  They and their office staff help keep track of your seeing other providers (doctors/nurse practitioners) and tests (CT/ MRIs/ X Rays)) taken.        PCPs want you to stay healthy.  Let us care for you.                       What Do I Do If I Wake Up Sick                                                     If you wake up sick, or you start to fill sick during the day, try these tips to get care and start to feel better soon.                                                                                                                              As soon as you start to feel bad, call your doctor's office and ask for same day or next day visit appointment.        If you cannot be seen with your doctor's  office within 24 hours, you can go to an urgent care and be seen.          How to stay well:     Take your medicine as ordered by your doctor      Fill your Medicine before you run out      Exercise       Enjoy walking in the sunlight daily  Keep your scheduled clinic appointments      Keep you scheduled yearly wellness visits                Budget-Friendly Healthy Eating    Save money at the grocery store and eat healthy.   Buy groceries keeping your budget in mind  Make a grocery list and only buy what you have on the list  Eat food you cook or have at home; limit fast food or eating out    Compare food labels  Look at store brand food labels and compare to brand names, often food value is the same and the store brand is cheaper      Look for products that do not have sugar, fat, or salt (sodium) added.  These often cost the same but are healthier for you.  They may be labeled as:  ?Sugar-free.  ?Nonfat.              ?Low-fat.  ?Sodium-free.  ?Low sodium.  Look for lean ground beef labeled as at least 92% lean and 8% fat.        Shopping    Buy only the items on your grocery list and go only to the areas of the store that have the items on your list.  Use coupons only for foods and brands you normally buy. Avoid buying items you wouldn't normally buy simply because they are on sale.  Check online and in newspapers for weekly deals.  Buy healthy items from the bulk bins when available, such as herbs, spices, flour, pasta, nuts, and dried fruit.  Buy fruits and vegetables that are in season. Prices are usually lower on in-season produce.  Look at the unit price on the price tag. Use it to compare different brands and sizes to find out which item is the best deal.  Choose healthy items that are often low-cost, such as carrots, potatoes, apples, bananas, and oranges. Dried or canned beans are a low-cost protein source.      Buy in bulk and freeze extra food. Items you can buy in bulk include meats, fish, poultry,  "frozen fruits, and frozen vegetables.  Avoid buying "ready-to-eat" foods, such as pre-cut fruits and vegetables and pre-made salads.  If possible, shop around to discover where you can find the best prices. Consider other retailers such as dollar stores, larger wholesale stores, local fruit and vegetable stands, and farmers markets.  Do not shop when you are hungry. If you shop while hungry, it may be hard to stick to your list and budget.      Resist impulse buying. Use your grocery list as your official plan for the week.      Buy a variety of vegetables and fruits by purchasing fresh, frozen, and canned items.  Look at the top and bottom shelves for deals. Foods at eye level (eye level of an adult or child) are usually more expensive.  Be efficient with your time when shopping. The more time you spend at the store, the more money you are likely to spend.  To save money when choosing more expensive foods like meats and dairy:  ?Choose cheaper cuts of meat, such as bone-in chicken thighs and drumsticks instead of skinless and boneless chicken. When you are ready to prepare the chicken, you can remove the skin yourself to make it healthier.  ?Choose lean meats like chicken or turkey instead of beef.  ?Choose canned seafood, such as tuna, salmon, or sardines.  ?Buy eggs as a low-cost source of protein.  ?Buy dried beans and peas, such as lentils, split peas, or kidney beans instead of meats. Dried beans and peas are a good alternative source of protein.  ?Buy the larger tubs of yogurt instead of individual-sized containers.                        Choose water instead of sodas and other sweetened beverages.  Avoid buying chips, cookies, and other "junk food." These items are usually expensive and not healthy.  Meal planning  Do not eat out or get fast food. Prepare food at home.  Make a grocery list and make sure to bring it with you to the store.   Plan meals and snacks according to a grocery list and budget you " "create.  Use leftovers in your meal plan for the week.  Look for recipes where you can cook once and make enough food for two meals.  Include budget-friendly meals like stews, casseroles, and stir-barillas dishes.  Try some meatless meals or try "no cook" meals like salads.  Make sure that half your plate is filled with fruits or vegetables. Choose from fresh, frozen, or canned fruits and vegetables. If eating canned, remember to rinse them before eating. This will remove any excess salt added for packaging.            Summary  Eating healthy on a budget is possible if you plan your meals according to your budget, buy according to your budget and grocery list, and prepare food yourself.   Tips for buying more food on a limited budget include buying generic brands, using coupons only for foods you normally buy, and buying healthy items from the bulk bins when available.  Tips for buying cheaper food to replace expensive food include choosing cheaper, lean cuts of meat, and buying dried beans and peas.    Discuss any question you have with your doctor.                Why is taking care of your mouth/teeth/gums important?     Your mouth is the opening to your body.  If not kept clean, it can let in sickness to the rest of your body.     [facility] (Dentists)                 City:  Provider Address Phone Number Insurance Plan   [place]:  None available                    Thea Nunez, [place] [facility] The[address] [phone]   ADULTS ONLY Medicaid:  HB & AETNA ONLY             [place]         [facility] ([facility] [address] [phone]  DENTURES ONLY ADULT Medicaid:  HB & AETNA ONLY             [facility] [address] [facility] [phone] All Medicaid/Medicare             Tamir Guzman, CAROLIN [address]  [facility] Tamir Oconnor [phone] Medicaid Children only 2 to 21           "   [place]         Marcus Simons [address] [place] [phone] Accepts:   LHC, HB, Aetna         [facility] [address] [facility], [place] [phone] Medicare             [facility] [address], [place] [phone] Medicaid Children only 2 to 21             [facility] [address], [place] [phone] Medicaid Children only 2 to 21             [place] [facility]  [address], [place] [phone] Medicaid Children only 5 yrs and younger:  lip/tongue tie             [facility] [address] [facility] [phone] Medicaid Children only 2 to 21          [facility]  [address] [facility]  [phone]           [facility] [address] [facility] [phone] All Medicaid/Medicare :  ADULTS             [facility] [address] [facility] [phone] All Medicaid/Medicare :  ADULTS             [facility] [address] [facility] [phone] Medicaid Children only 2 to 21             [facility]  [address] [place] [phone] Medicaid Children only 2 to 21             Dr. Jacinta Tay,  [facility] [phone] Medicare for Dentures Only             [facility] [address] [facility] [address] [phone] All Medicaid/Medicare :   EXCEPT [facility]; ADULTS         Matt Damon [address] [facility] [phone] Accepts:   LHC, HB, Aetna             [address] [phone]  UHC, IHC, HB, Aetna/Medicare :  ADULTS     [facility]; [place]: [phone]  \[facility] [place] [facility]\"" [facility]; [place]: [phone]

## 2023-05-17 ENCOUNTER — PATIENT OUTREACH (OUTPATIENT)
Dept: EMERGENCY MEDICINE | Facility: HOSPITAL | Age: 33
End: 2023-05-17
Payer: MEDICAID

## 2023-05-17 NOTE — PROGRESS NOTES
Sent text message to remind patient of appointment at [facility] for a CT Temporal Bones 5/18/2023 1030 with arrival at 1000.    Appointment Reminder 5/31/2023  Follow up 6/13/2023

## 2023-05-18 ENCOUNTER — HOSPITAL ENCOUNTER (OUTPATIENT)
Dept: RADIOLOGY | Facility: HOSPITAL | Age: 33
Discharge: HOME | End: 2023-05-18
Attending: STUDENT IN AN ORGANIZED HEALTH CARE EDUCATION/TRAINING PROGRAM
Payer: MEDICAID

## 2023-05-18 DIAGNOSIS — H93.13 TINNITUS, BILATERAL: ICD-10-CM

## 2023-05-18 DIAGNOSIS — H93.8X1 EAR CANAL MASS, RIGHT: ICD-10-CM

## 2023-05-18 PROCEDURE — 70480 CT ORBIT/EAR/FOSSA W/O DYE: CPT | Mod: TC

## 2023-05-31 ENCOUNTER — PATIENT OUTREACH (OUTPATIENT)
Dept: EMERGENCY MEDICINE | Facility: HOSPITAL | Age: 33
End: 2023-05-31
Payer: MEDICAID

## 2023-06-01 NOTE — PROGRESS NOTES
"Identity verified.  Reminded patient of appointment with Carmelina Zapata NP 6/2/2023 0830.  Patient verbalized understanding and states he will return call to discuss "where he is and to get a new doctor."     6/1/2023 1450 Attempted to contact patient with an appointment reminder for Carmelina Zapata NP 6/2/2023 0830, no answer, no voicemail.  1452  Sent text message to remind patient of appointment with Carmelina Zapata NP 6/2/2023 0830 to establish care.  Also provided clinic address and phone number.    Follow up 6/13/2023    "

## 2023-06-13 ENCOUNTER — PATIENT OUTREACH (OUTPATIENT)
Dept: EMERGENCY MEDICINE | Facility: HOSPITAL | Age: 33
End: 2023-06-13
Payer: MEDICAID

## 2023-06-15 ENCOUNTER — PATIENT OUTREACH (OUTPATIENT)
Dept: EMERGENCY MEDICINE | Facility: HOSPITAL | Age: 33
End: 2023-06-15
Payer: MEDICAID

## 2023-12-10 ENCOUNTER — HOSPITAL ENCOUNTER (EMERGENCY)
Facility: HOSPITAL | Age: 33
Discharge: HOME | End: 2023-12-10
Attending: EMERGENCY MEDICINE
Payer: COMMERCIAL

## 2023-12-10 VITALS
HEART RATE: 73 BPM | WEIGHT: 175 LBS | OXYGEN SATURATION: 100 % | RESPIRATION RATE: 18 BRPM | TEMPERATURE: 98 F | DIASTOLIC BLOOD PRESSURE: 77 MMHG | BODY MASS INDEX: 25.92 KG/M2 | SYSTOLIC BLOOD PRESSURE: 135 MMHG | HEIGHT: 69 IN

## 2023-12-10 DIAGNOSIS — A60.01 HERPES SIMPLEX INFECTION OF PENIS: Primary | ICD-10-CM

## 2023-12-10 PROCEDURE — 99281 EMR DPT VST MAYX REQ PHY/QHP: CPT

## 2023-12-10 RX ORDER — VALACYCLOVIR HYDROCHLORIDE 500 MG/1
500 TABLET, FILM COATED ORAL 2 TIMES DAILY
Qty: 10 TABLET | Refills: 0 | Status: SHIPPED | OUTPATIENT
Start: 2023-12-10 | End: 2023-12-15

## 2023-12-10 NOTE — ED PROVIDER NOTES
Encounter Date: 12/10/2023       History     Chief Complaint   Patient presents with    Medication Refill     Pt reports he is here for a refill of his valacyclovir; states he is having a herpes flare up.       This 33-year-old man presents with request to have a valacyclovir refill.  He states he is having an outbreak of genital herpes.       Review of patient's allergies indicates:  No Known Allergies  No past medical history on file.  No past surgical history on file.  No family history on file.  Social History     Tobacco Use    Smoking status: Some Days     Types: Cigarettes    Smokeless tobacco: Never     Review of Systems   Constitutional:  Negative for fever.   HENT:  Negative for sore throat.    Respiratory:  Negative for shortness of breath.    Cardiovascular:  Negative for chest pain.   Gastrointestinal:  Negative for nausea.   Genitourinary:  Negative for dysuria.   Musculoskeletal:  Negative for back pain.   Skin:  Positive for rash.   Neurological:  Negative for weakness.   Hematological:  Does not bruise/bleed easily.       Physical Exam     Initial Vitals [12/10/23 1240]   BP Pulse Resp Temp SpO2   135/77 73 18 97.7 °F (36.5 °C) 100 %      MAP       --         Physical Exam    Constitutional: He appears well-developed and well-nourished.   HENT:   Head: Normocephalic and atraumatic.   Mouth/Throat: Mucous membranes are normal.   Eyes: EOM are normal. Pupils are equal, round, and reactive to light.   Neck: Neck supple.   Normal range of motion.  Cardiovascular:  Normal rate, regular rhythm, normal heart sounds and intact distal pulses.           Pulmonary/Chest: Breath sounds normal.   Abdominal: Abdomen is soft. Bowel sounds are normal.   Genitourinary:    Genitourinary Comments: Painful ulcers on penis     Musculoskeletal:         General: Normal range of motion.      Cervical back: Normal range of motion and neck supple.     Neurological: He is alert and oriented to person, place, and time. He has  normal strength.   Skin: Skin is warm and dry. Capillary refill takes less than 2 seconds.   Psychiatric: He has a normal mood and affect. His behavior is normal. Judgment and thought content normal.         ED Course   Procedures  Labs Reviewed - No data to display       Imaging Results    None          Medications - No data to display  Medical Decision Making                                    Clinical Impression:  Final diagnoses:  [A60.01] Herpes simplex infection of penis (Primary)          ED Disposition Condition    Discharge Stable          ED Prescriptions       Medication Sig Dispense Start Date End Date Auth. Provider    valACYclovir (VALTREX) 500 MG tablet Take 1 tablet (500 mg total) by mouth 2 (two) times daily. for 5 days 10 tablet 12/10/2023 12/15/2023 Harshal Goldberg MD          Follow-up Information       Follow up With Specialties Details Why Contact Info    Follow up with your MD as needed.                 Harshal Goldberg MD  12/10/23 5045

## 2025-07-21 ENCOUNTER — HOSPITAL ENCOUNTER (EMERGENCY)
Facility: HOSPITAL | Age: 35
Discharge: LEFT BEFORE DISCHARGE | End: 2025-07-21
Attending: INTERNAL MEDICINE

## 2025-07-21 VITALS
HEART RATE: 67 BPM | TEMPERATURE: 98 F | RESPIRATION RATE: 18 BRPM | SYSTOLIC BLOOD PRESSURE: 123 MMHG | DIASTOLIC BLOOD PRESSURE: 79 MMHG | HEIGHT: 69 IN | WEIGHT: 153.88 LBS | OXYGEN SATURATION: 100 % | BODY MASS INDEX: 22.79 KG/M2

## 2025-07-21 DIAGNOSIS — A60.01 HERPES SIMPLEX INFECTION OF PENIS: ICD-10-CM

## 2025-07-21 DIAGNOSIS — Z85.819 HISTORY OF THROAT CANCER: Primary | ICD-10-CM

## 2025-07-21 LAB
ALBUMIN SERPL-MCNC: 4.1 G/DL (ref 3.5–5)
ALBUMIN/GLOB SERPL: 1 RATIO (ref 1.1–2)
ALP SERPL-CCNC: 75 UNIT/L (ref 40–150)
ALT SERPL-CCNC: 10 UNIT/L (ref 0–55)
ANION GAP SERPL CALC-SCNC: 8 MEQ/L
AST SERPL-CCNC: 23 UNIT/L (ref 11–45)
BASOPHILS # BLD AUTO: 0.04 X10(3)/MCL
BASOPHILS NFR BLD AUTO: 0.7 %
BILIRUB SERPL-MCNC: 0.4 MG/DL
BUN SERPL-MCNC: 11.7 MG/DL (ref 8.9–20.6)
CALCIUM SERPL-MCNC: 9 MG/DL (ref 8.4–10.2)
CHLORIDE SERPL-SCNC: 102 MMOL/L (ref 98–107)
CO2 SERPL-SCNC: 30 MMOL/L (ref 22–29)
CREAT SERPL-MCNC: 1.03 MG/DL (ref 0.72–1.25)
CREAT/UREA NIT SERPL: 11
EOSINOPHIL # BLD AUTO: 0.34 X10(3)/MCL (ref 0–0.9)
EOSINOPHIL NFR BLD AUTO: 5.6 %
ERYTHROCYTE [DISTWIDTH] IN BLOOD BY AUTOMATED COUNT: 12.4 % (ref 11.5–17)
GFR SERPLBLD CREATININE-BSD FMLA CKD-EPI: >60 ML/MIN/1.73/M2
GLOBULIN SER-MCNC: 4 GM/DL (ref 2.4–3.5)
GLUCOSE SERPL-MCNC: 96 MG/DL (ref 74–100)
HCT VFR BLD AUTO: 43.9 % (ref 42–52)
HGB BLD-MCNC: 14.7 G/DL (ref 14–18)
IMM GRANULOCYTES # BLD AUTO: 0.01 X10(3)/MCL (ref 0–0.04)
IMM GRANULOCYTES NFR BLD AUTO: 0.2 %
LYMPHOCYTES # BLD AUTO: 1.6 X10(3)/MCL (ref 0.6–4.6)
LYMPHOCYTES NFR BLD AUTO: 26.4 %
MCH RBC QN AUTO: 31.7 PG (ref 27–31)
MCHC RBC AUTO-ENTMCNC: 33.5 G/DL (ref 33–36)
MCV RBC AUTO: 94.8 FL (ref 80–94)
MONOCYTES # BLD AUTO: 0.61 X10(3)/MCL (ref 0.1–1.3)
MONOCYTES NFR BLD AUTO: 10.1 %
NEUTROPHILS # BLD AUTO: 3.45 X10(3)/MCL (ref 2.1–9.2)
NEUTROPHILS NFR BLD AUTO: 57 %
NRBC BLD AUTO-RTO: 0 %
PLATELET # BLD AUTO: 236 X10(3)/MCL (ref 130–400)
PMV BLD AUTO: 10.2 FL (ref 7.4–10.4)
POTASSIUM SERPL-SCNC: 3.4 MMOL/L (ref 3.5–5.1)
PROT SERPL-MCNC: 8.1 GM/DL (ref 6.4–8.3)
RBC # BLD AUTO: 4.63 X10(6)/MCL (ref 4.7–6.1)
SODIUM SERPL-SCNC: 140 MMOL/L (ref 136–145)
WBC # BLD AUTO: 6.05 X10(3)/MCL (ref 4.5–11.5)

## 2025-07-21 PROCEDURE — 80053 COMPREHEN METABOLIC PANEL: CPT | Performed by: INTERNAL MEDICINE

## 2025-07-21 PROCEDURE — 99283 EMERGENCY DEPT VISIT LOW MDM: CPT

## 2025-07-21 PROCEDURE — 85025 COMPLETE CBC W/AUTO DIFF WBC: CPT | Performed by: INTERNAL MEDICINE

## 2025-07-21 RX ORDER — VALACYCLOVIR HYDROCHLORIDE 500 MG/1
500 TABLET, FILM COATED ORAL 2 TIMES DAILY
Qty: 10 TABLET | Refills: 0 | Status: SHIPPED | OUTPATIENT
Start: 2025-07-21 | End: 2025-07-26

## 2025-07-21 NOTE — ED PROVIDER NOTES
Encounter Date: 7/21/2025       History     Chief Complaint   Patient presents with    throat pain     States difficulty swallowing, sleeping and in pain for weeks.  States throat cancer treated with surgery, chemo and radiation in [place] in [phone].  States back from [place] and needs primary care here.      Medication Refill     States having outbreak of genital herpes.     Presents requesting PCP to follow up on his neck cancer. Receive partial treatment in [place] (Chemo/Radiation) but stopped because was feeling better.  States having some pain and dysphagia for which will like to know if cancer recurred and have a local MD. Pt also states genital herpes relapse and will like some medication    The history is provided by the patient and medical records.     Review of patient's allergies indicates:  No Known Allergies  Past Medical History:   Diagnosis Date    Genital herpes     Oral cancer      Past Surgical History:   Procedure Laterality Date    head and neck surgery Right      No family history on file.  Social History[1]  Review of Systems    Physical Exam     Initial Vitals [07/21/25 0356]   BP Pulse Resp Temp SpO2   112/63 85 17 97.9 °F (36.6 °C) 97 %      MAP       --         Physical Exam    Nursing note and vitals reviewed.  Constitutional: He appears well-developed. No distress.   HENT:   Head: Normocephalic and atraumatic.   Nose: Nose normal. Mouth/Throat: Oropharynx is clear and moist. No oropharyngeal exudate.   Rt facial paralysis (Chronic)   Eyes: Conjunctivae and EOM are normal. Pupils are equal, round, and reactive to light.   Neck: Neck supple. No tracheal deviation present. No JVD present.   Post surgical changes Rt neck area   Normal range of motion.  Cardiovascular:  Regular rhythm, normal heart sounds and intact distal pulses.           Pulmonary/Chest: Breath sounds normal. No respiratory distress.   Abdominal: Abdomen is soft. Bowel sounds are normal. He exhibits no distension. There is no  "abdominal tenderness. There is no rebound and no guarding.   Genitourinary:    Genitourinary Comments: Scattered vesicles among penis base     Musculoskeletal:         General: No edema. Normal range of motion.      Cervical back: Normal range of motion and neck supple.     Neurological: He is alert and oriented to person, place, and time. GCS score is 15. GCS eye subscore is 4. GCS verbal subscore is 5. GCS motor subscore is 6.   Rt facial paralysis (Chronic)   Skin: Skin is warm and dry. No rash noted.   Psychiatric: Thought content normal.         ED Course   Procedures  Labs Reviewed   CBC WITH DIFFERENTIAL - Abnormal       Result Value    WBC 6.05      RBC 4.63 (*)     Hgb 14.7      Hct 43.9      MCV 94.8 (*)     MCH 31.7 (*)     MCHC 33.5      RDW 12.4      Platelet 236      MPV 10.2      Neut % 57.0      Lymph % 26.4      Mono % 10.1      Eos % 5.6      Basophil % 0.7      Imm Grans % 0.2      Neut # 3.45      Lymph # 1.60      Mono # 0.61      Eos # 0.34      Baso # 0.04      Imm Gran # 0.01      NRBC% 0.0     CBC W/ AUTO DIFFERENTIAL    Narrative:     The following orders were created for panel order CBC auto differential.  Procedure                               Abnormality         Status                     ---------                               -----------         ------                     CBC with Differential[[id number]]       Abnormal            Final result                 Please view results for these tests on the individual orders.   COMPREHENSIVE METABOLIC PANEL   URINALYSIS, REFLEX TO URINE CULTURE          Imaging Results    None          Medications - No data to display  Medical Decision Making  Amount and/or Complexity of Data Reviewed  External Data Reviewed: notes.     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 he says he feels very well with no " "pain no trouble swallowing no weight loss or anything but he and I had a long conversation again today been about his noncompliance with the recommended therapy I told him and exact terms that the fact that he did not finish the chemotherapy and radiation is a risk to his own life and that he still has the option to go back and completed and he refused to make sure this was very clear and I want to leave no chance in the medical record that there would be any consideration that he was not warned explicitly that he did not get the appropriate treatment that he not complete the appropriate treatment as recommended for his head neck cancer suffice to say at this point the next best thing we can do is rescan him and see if he has any residual disease somewhere to go ahead and get a new neck and chest CT and see him back again in a month"  Labs: ordered.  Radiology: ordered.    Risk  Prescription drug management.                         5:35 AM    At this time pt requesting discharge. He understands the consequences of leaving AMA w/o completing his test, specially his CT to assess for his cancer recurrence                 Clinical Impression:  Final diagnoses:  [Z85.819] History of throat cancer (Primary)  [A60.01] Herpes simplex infection of penis          ED Disposition Condition    AMA                       [1]   Social History  Tobacco Use    Smoking status: Some Days     Types: Cigarettes    Smokeless tobacco: Never   Vaping Use    Vaping status: Never Used   Substance Use Topics    Alcohol use: Not Currently    Drug use: Yes     Types: Marijuana        Frantz Whittington MD  07/21/25 0536    "

## 2025-07-21 NOTE — LETTER
Patient: Brock Javier  YOB: 1990  Date: 7/21/2025 Time: 5:34 AM  Location: [facility]    Leaving the Hospital Against Medical Advice    Chart #:[id number]    This will certify that I, the undersigned,    ______________________________________________________________________    A patient in the above named medical center, having requested discharge and removal from the medical center against the advice of my attending physician(s), hereby release [facility], its physicians, officers and employees, severally and individually, from any and all liability of any nature whatsoever for any injury or harm or complication of any kind that may result directly or indirectly, by reason of my terminating my stay as a patient at [facility] and my departure from [facility], and hereby waive any and all rights of action I may now have or later acquire as a result of my voluntary departure from [facility] and the termination of my stay as a patient therein.    This release is made with the full knowledge of the danger that may result from the action which I am taking.      Date:_______________________                         ___________________________                                                                                    Patient/Legal Representative    Witness:        ____________________________                          ___________________________  Nurse                                                                        Physician

## 2025-07-21 NOTE — ED NOTES
Patient unclear with history and needs at this time.  States left treatment because was feeling better.